# Patient Record
Sex: FEMALE | URBAN - METROPOLITAN AREA
[De-identification: names, ages, dates, MRNs, and addresses within clinical notes are randomized per-mention and may not be internally consistent; named-entity substitution may affect disease eponyms.]

---

## 2017-10-05 ENCOUNTER — OFFICE VISIT (OUTPATIENT)
Dept: ORTHOPEDICS | Facility: CLINIC | Age: 22
End: 2017-10-05

## 2017-10-05 VITALS — HEIGHT: 71 IN | WEIGHT: 140 LBS | BODY MASS INDEX: 19.6 KG/M2

## 2017-10-05 DIAGNOSIS — M22.2X1 PATELLOFEMORAL PAIN SYNDROME OF RIGHT KNEE: Primary | ICD-10-CM

## 2017-10-05 NOTE — PROGRESS NOTES
"Sports Medicine Clinic Visit    PCP: No primary care provider on file.    Rosa Norris is a 22 year old female who is seen  as self referral presenting with right knee pain.     Injury: None recalled    Location of Pain: right knee  Duration of Pain: 1 month(s)  Pain is better with: Rest  Pain is worse with: Running, stairs   Additional Features:   Treatment so far consists of: Rest  Prior History of related problems:     Ht 5' 11\" (1.803 m)  Wt 140 lb (63.5 kg)  BMI 19.53 kg/m2         PMH:  No past medical history on file. neg for chronic med probs    Active problem list:  There is no problem list on file for this patient.      FH:  No family history on file.    SH:  Social History     Social History     Marital status: Single     Spouse name: N/A     Number of children: N/A     Years of education: N/A     Occupational History     Not on file.     Social History Main Topics     Smoking status: Never Smoker     Smokeless tobacco: Never Used     Alcohol use Not on file     Drug use: Not on file     Sexual activity: Not on file     Other Topics Concern     Not on file     Social History Narrative     No narrative on file     Graduated [employer]    MEDS:  See EMR, reviewed  ALL:  See EMR, reviewed    REVIEW OF SYSTEMS:  CONSTITUTIONAL:NEGATIVE for fever, chills, change in weight  INTEGUMENTARY/SKIN: NEGATIVE for worrisome rashes, moles or lesions  EYES: NEGATIVE for vision changes or irritation  ENT/MOUTH: NEGATIVE for ear, mouth and throat problems  RESP:NEGATIVE for significant cough or SOB  BREAST: NEGATIVE for masses, tenderness or discharge  CV: NEGATIVE for chest pain, palpitations or peripheral edema  GI: NEGATIVE for nausea, abdominal pain, heartburn, or change in bowel habits  :NEGATIVE for frequency, dysuria, or hematuria  :NEGATIVE for frequency, dysuria, or hematuria  NEURO: NEGATIVE for weakness, dizziness or paresthesias  ENDOCRINE: NEGATIVE for temperature intolerance, skin/hair " "changes  HEME/ALLERGY/IMMUNE: NEGATIVE for bleeding problems  PSYCHIATRIC: NEGATIVE for changes in mood or affect        Subjective: This 22-year-old female presents with 1 month of right anterior knee discomfort. 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 In the middle of August she did 17 mile race. She made it through the race without any discomfort and did not have any trouble training prior to the race.  For 10 days after the race she had no discomfort. She tried to return to running after that and about 5 miles into the run she had some right-sided anterior knee discomfort that she could feel \"behind the kneecap\". It does not bother her at work. Lately she's been able to do some walking and does not seem to give her pain. She's taken time off running in the past few weeks. The knee has not been swollen.    Objective: She has some tenderness along the posterior lateral patellar facet that is not present at the opposite knee. She has some increased lateral excursion to the kneecaps compared to medial. There is no swelling. She is nontender over the remainder of the patellar facets. Nontender over quadriceps tendon or patellar tendon. Nontender over distal IT band. Nontender over medial or lateral joint line. Anterior and posterior drawers negative. Lachman's is negative. Normal range of motion.. Overlying skin is normal. Appropriate in conversation and affect. Normal arches bilaterally. She has good 2 legged squat form and can do a full squat. She has some improvement secondary made in 1 legged squat form.    Assessment: Right-sided anterior knee discomfort suspect patellofemoral discomfort    Plan: We discussed the possibility that there could be a stress reaction of the bone. She is to be taken some time off running and she agrees to cross train over the next month with some biking or in a pool or simply walking and avoid running until the " pain improves. In the meantime she'll see physical therapy for patellofemoral protocol and to try some kneecap taping. She understands if the symptoms resolve over the next 4-6 weeks that when she starts back to running she'll start on flat surfaces with shorter distances and make sure it's well-tolerated before increasing her mileage.

## 2017-10-05 NOTE — MR AVS SNAPSHOT
After Visit Summary   10/5/2017    Rosa Norris    MRN: [id number]           Patient Information     Date Of Birth          1995        Visit Information        Provider Department      10/5/2017 11:30 AM James Cobb MD [facility] Sports Medicine        Today's Diagnoses     Patellofemoral pain syndrome of right knee    -  1       Follow-ups after your visit        Additional Services     PHYSICAL THERAPY REFERRAL (Internal)       Physical Therapy Referral                  Who to contact     Please call your clinic at [phone] to:    Ask questions about your health    Make or cancel appointments    Discuss your medicines    Learn about your test results    Speak to your doctor   If you have compliments or concerns about an experience at your clinic, or if you wish to file a complaint, please contact [facility] Patient Relations at [phone] or email us at [email or web address]         Additional Information About Your Visit        MyChart Information     [email or web address] is an electronic gateway that provides easy, online access to your medical records. With [email or web address], you can request a clinic appointment, read your test results, renew a prescription or communicate with your care team.     To sign up for [employer] visit the website at [email or web address]   You will be asked to enter the access code listed below, as well as some personal information. Please follow the directions to create your username and password.     Your access code is: [email or web address]  Expires: 2018  6:31 AM     Your access code will  in 90 days. If you need help or a new code, please contact your [facility] Clinic or call [phone] for assistance.        Care EveryWhere ID     This is your Care EveryWhere ID. This could be used by other organizations to access your [place] medical records  [id number]        Your Vitals Were     Height BMI (Body  "Mass Index)                5' 11\" (1.803 m) 19.53 kg/m2           Blood Pressure from Last 3 Encounters:   No data found for BP    Weight from Last 3 Encounters:   10/05/17 140 lb (63.5 kg)              We Performed the Following     PHYSICAL THERAPY REFERRAL (Internal)        Primary Care Provider    None Specified       No primary provider on file.        Equal Access to Services     [facility]: Hadii owen ku hadnguyeno Soedgardoali, waaxda luqadaha, qaybta kaalmada rosalbayolandada, kendall camargoeniddenice martinez . So [facility] [phone].    ATENCIÓN: Si habla español, tiene a jenkins disposición servicios gratuitos de asistencia lingüística. Llame al [phone].    We comply with applicable federal civil rights laws and [place] laws. We do not discriminate on the basis of race, color, national origin, age, disability, sex, sexual orientation, or gender identity.            Thank you!     Thank you for choosing [facility]  for your care. Our goal is always to provide you with excellent care. Hearing back from our patients is one way we can continue to improve our services. Please take a few minutes to complete the written survey that you may receive in the mail after your visit with us. Thank you!             Your Updated Medication List - Protect others around you: Learn how to safely use, store and throw away your medicines at www.disposemymeds.org.      Notice  As of 10/5/2017 12:18 PM    You have not been prescribed any medications.      "

## 2017-10-05 NOTE — LETTER
Date:October 6, 2017      Patient was self referred, no letter generated. Do not send.        [facility] Health Information

## 2017-10-05 NOTE — LETTER
"  10/5/2017      RE: Rosa Norris  [address] [place] [address]       Sports Medicine Clinic Visit    PCP: No primary care provider on file.    Rosa Norris is a 22 year old female who is seen  as self referral presenting with right knee pain.     Injury: None recalled    Location of Pain: right knee  Duration of Pain: 1 month(s)  Pain is better with: Rest  Pain is worse with: Running, stairs   Additional Features:   Treatment so far consists of: Rest  Prior History of related problems:     Ht 5' 11\" (1.803 m)  Wt 140 lb (63.5 kg)  BMI 19.53 kg/m2         PMH:  No past medical history on file. neg for chronic med probs    Active problem list:  There is no problem list on file for this patient.      FH:  No family history on file.    SH:  Social History     Social History     Marital status: Single     Spouse name: N/A     Number of children: N/A     Years of education: N/A     Occupational History     Not on file.     Social History Main Topics     Smoking status: Never Smoker     Smokeless tobacco: Never Used     Alcohol use Not on file     Drug use: Not on file     Sexual activity: Not on file     Other Topics Concern     Not on file     Social History Narrative     No narrative on file     Graduated [employer]    MEDS:  See EMR, reviewed  ALL:  See EMR, reviewed    REVIEW OF SYSTEMS:  CONSTITUTIONAL:NEGATIVE for fever, chills, change in weight  INTEGUMENTARY/SKIN: NEGATIVE for worrisome rashes, moles or lesions  EYES: NEGATIVE for vision changes or irritation  ENT/MOUTH: NEGATIVE for ear, mouth and throat problems  RESP:NEGATIVE for significant cough or SOB  BREAST: NEGATIVE for masses, tenderness or discharge  CV: NEGATIVE for chest pain, palpitations or peripheral edema  GI: NEGATIVE for nausea, abdominal pain, heartburn, or change in bowel habits  :NEGATIVE for frequency, dysuria, or hematuria  :NEGATIVE for frequency, dysuria, or hematuria  NEURO: NEGATIVE for weakness, dizziness or " "paresthesias  ENDOCRINE: NEGATIVE for temperature intolerance, skin/hair changes  HEME/ALLERGY/IMMUNE: NEGATIVE for bleeding problems  PSYCHIATRIC: NEGATIVE for changes in mood or affect        Subjective: This 22-year-old female presents with 1 month of right anterior knee discomfort. 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 In the middle of August she did 17 mile race. She made it through the race without any discomfort and did not have any trouble training prior to the race.  For 10 days after the race she had no discomfort. She tried to return to running after that and about 5 miles into the run she had some right-sided anterior knee discomfort that she could feel \"behind the kneecap\". It does not bother her at work. Lately she's been able to do some walking and does not seem to give her pain. She's taken time off running in the past few weeks. The knee has not been swollen.    Objective: She has some tenderness along the posterior lateral patellar facet that is not present at the opposite knee. She has some increased lateral excursion to the kneecaps compared to medial. There is no swelling. She is nontender over the remainder of the patellar facets. Nontender over quadriceps tendon or patellar tendon. Nontender over distal IT band. Nontender over medial or lateral joint line. Anterior and posterior drawers negative. Lachman's is negative. Normal range of motion.. Overlying skin is normal. Appropriate in conversation and affect. Normal arches bilaterally. She has good 2 legged squat form and can do a full squat. She has some improvement secondary made in 1 legged squat form.    Assessment: Right-sided anterior knee discomfort suspect patellofemoral discomfort    Plan: We discussed the possibility that there could be a stress reaction of the bone. She is to be taken some time off running and she agrees to cross train over the next month " with some biking or in a pool or simply walking and avoid running until the pain improves. In the meantime she'll see physical therapy for patellofemoral protocol and to try some kneecap taping. She understands if the symptoms resolve over the next 4-6 weeks that when she starts back to running she'll start on flat surfaces with shorter distances and make sure it's well-tolerated before increasing her mileage.                        James Cobb MD

## 2017-10-11 ENCOUNTER — THERAPY VISIT (OUTPATIENT)
Dept: PHYSICAL THERAPY | Facility: CLINIC | Age: 22
End: 2017-10-11
Payer: COMMERCIAL

## 2017-10-11 DIAGNOSIS — M25.561 RIGHT KNEE PAIN, UNSPECIFIED CHRONICITY: Primary | ICD-10-CM

## 2017-10-11 PROCEDURE — 97530 THERAPEUTIC ACTIVITIES: CPT | Mod: GP | Performed by: PHYSICAL THERAPIST

## 2017-10-11 PROCEDURE — 97110 THERAPEUTIC EXERCISES: CPT | Mod: GP | Performed by: PHYSICAL THERAPIST

## 2017-10-11 PROCEDURE — 97161 PT EVAL LOW COMPLEX 20 MIN: CPT | Mod: GP | Performed by: PHYSICAL THERAPIST

## 2017-10-11 ASSESSMENT — ACTIVITIES OF DAILY LIVING (ADL)
AS_A_RESULT_OF_YOUR_KNEE_INJURY,_HOW_WOULD_YOU_RATE_YOUR_CURRENT_LEVEL_OF_DAILY_ACTIVITY?: NEARLY NORMAL
LIMPING: I DO NOT HAVE THE SYMPTOM
SIT WITH YOUR KNEE BENT: ACTIVITY IS NOT DIFFICULT
SQUAT: ACTIVITY IS NOT DIFFICULT
STIFFNESS: I DO NOT HAVE THE SYMPTOM
WEAKNESS: I DO NOT HAVE THE SYMPTOM
KNEEL ON THE FRONT OF YOUR KNEE: ACTIVITY IS NOT DIFFICULT
HOW_WOULD_YOU_RATE_THE_CURRENT_FUNCTION_OF_YOUR_KNEE_DURING_YOUR_USUAL_DAILY_ACTIVITIES_ON_A_SCALE_FROM_0_TO_100_WITH_100_BEING_YOUR_LEVEL_OF_KNEE_FUNCTION_PRIOR_TO_YOUR_INJURY_AND_0_BEING_THE_INABILITY_TO_PERFORM_ANY_OF_YOUR_USUAL_DAILY_ACTIVITIES?: 95
GIVING WAY, BUCKLING OR SHIFTING OF KNEE: I DO NOT HAVE THE SYMPTOM
RISE FROM A CHAIR: ACTIVITY IS NOT DIFFICULT
SWELLING: I DO NOT HAVE THE SYMPTOM
HOW_WOULD_YOU_RATE_THE_OVERALL_FUNCTION_OF_YOUR_KNEE_DURING_YOUR_USUAL_DAILY_ACTIVITIES?: NORMAL
KNEE_ACTIVITY_OF_DAILY_LIVING_SCORE: 98.57
PAIN: I DO NOT HAVE THE SYMPTOM
KNEE_ACTIVITY_OF_DAILY_LIVING_SUM: 69
GO DOWN STAIRS: ACTIVITY IS MINIMALLY DIFFICULT
RAW_SCORE: 69
STAND: ACTIVITY IS NOT DIFFICULT
GO UP STAIRS: ACTIVITY IS NOT DIFFICULT
WALK: ACTIVITY IS NOT DIFFICULT

## 2017-10-11 NOTE — MR AVS SNAPSHOT
After Visit Summary   10/11/2017    Rosa Norris    MRN: [id number]           Patient Information     Date Of Birth          1995        Visit Information        Provider Department      10/11/2017 4:40 PM Marcia Waldrop PT [facility]        Today's Diagnoses     Right knee pain, unspecified chronicity    -  1       Follow-ups after your visit        Your next 10 appointments already scheduled     Oct 18, 2017  4:00 PM CDT   MATEUS Extremity with CHELSEY Romo [facility] ([facility])    [address] [place] [id number] [phone]            Oct 25, 2017  7:00 AM CDT   MATEUS Extremity with Rufina Mathis PTA   [facility] ([facility])    [address] [place] [id number] [phone]            Nov 09, 2017  4:50 PM CST   MATEUS Extremity with Marcia Waldrop PT   [facility] ([facility])    [address] [place] [id number] [phone]              Who to contact     If you have questions or need follow up information about today's clinic visit or your schedule please contact [facility] PHYSICAL THERAPY [facility] directly at [phone].  Normal or non-critical lab and imaging results will be communicated to you by MyChart, letter or phone within 4 business days after the clinic has received the results. If you do not hear from us within 7 days, please contact the clinic through MyChart or phone. If you have a critical or abnormal lab result, we will notify you by phone as soon as possible.  Submit refill requests through [employer] or call your pharmacy and they will forward the refill request to us. Please allow 3 business days for your refill to be completed.          Additional Information About Your Visit        [facility] Information     [employer] lets you send  "messages to your doctor, view your test results, renew your prescriptions, schedule appointments and more. To sign up, go to [email or web address] . Click on \"Log in\" on the left side of the screen, which will take you to the Welcome page. Then click on \"Sign up Now\" on the right side of the page.     You will be asked to enter the access code listed below, as well as some personal information. Please follow the directions to create your username and password.     Your access code is: [email or web address]  Expires: 2018  6:31 AM     Your access code will  in 90 days. If you need help or a new code, please call your [place] clinic or [phone].        Care EveryWhere ID     This is your Care EveryWhere ID. This could be used by other organizations to access your [facility] medical records  [id number]         Blood Pressure from Last 3 Encounters:   No data found for BP    Weight from Last 3 Encounters:   10/05/17 63.5 kg (140 lb)              We Performed the Following     HC PT EVAL, LOW COMPLEXITY     MATEUS INITIAL EVAL REPORT     THERAPEUTIC ACTIVITIES     THERAPEUTIC EXERCISES        Primary Care Provider    None Specified       No primary provider on file.        Equal Access to Services     [facility] : Hadii owen Vazquez, wavalareida james, qaybta kaalmada reginaldo, kendall martinez . So [facility] [phone].    ATENCIÓN: Si habla español, tiene a jenkins disposición servicios gratuitos de asistencia lingüística. Llame al [phone].    We comply with applicable federal civil rights laws and [place] laws. We do not discriminate on the basis of race, color, national origin, age, disability, sex, sexual orientation, or gender identity.            Thank you!     Thank you for choosing [facility] PHYSICAL THERAPY [facility]  for your care. Our goal is always to provide you with excellent care. Hearing back from our patients is one way we can continue to improve our services. Please " take a few minutes to complete the written survey that you may receive in the mail after your visit with us. Thank you!             Your Updated Medication List - Protect others around you: Learn how to safely use, store and throw away your medicines at www.disposemymeds.org.      Notice  As of 10/11/2017 11:59 PM    You have not been prescribed any medications.

## 2017-10-11 NOTE — PROGRESS NOTES
KEY PT FINDINGS:  1) Impaired proximal hip strength (right more than left)  2) Impaired SL mechanics and control  3) No tenderness to palpation, unable to provoke sx during today's visit.     Physical Therapy Initial Evaluation: Subjective History     Injury/Condition Details:  Presenting Complaint Right Knee Pain   Onset Timing/Date 9/28/2017 (Has not run since the 1st)   Mechanism Has been running since freshman in high school.   Took 1 week off in the beginning of August and then ran the [employer] relay in the middle of August. She went on a 7 miles run 12 days after the [place] and had to walk home due to the pain - was unable to go up the stairs after the run - the worst was descending a hill. Took 6 days off and resumed slowly and build mileage. Was able to do a 8 mile run 1 week before the TC 10 mile without problem but was unable to complete a 5 mile race.       Symptom Behavior Details    Primary Symptoms Sporadic symptoms; Activity/position dependent, pain (Location: lateral to the patellar tendon, Quality: Sharp), catching/locking (feels like it needs to crack)   Worst Pain 6/10 (with running)   Symptom Provocators Nothing beyond running, after running - stairs (descending)    Point in run symptoms begin: 3-4 mile  Better/ worse as run continues: worse     Best Pain 0/10    Symptom Relievers Not running   Time of day dependent? No   Recent symptom change? 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   Knee contusion  Ran in High School and College ([employer])   Notable medical history See Epic Chart           Lower Extremity Exam    Dynamic Movement Screen:  2 leg stance: Genu varus tibial alignment, neutral calcaneal posture, squinting patellae (left more than right)  2 leg squat:Excessive posterior hip excursion (reduced anterior knee excursion) and Toes off at end range.     1 leg stance:   Right: normal  Left: normal    1 leg squat:   Right: proprioceptive challenge, excessive femoral IR/ADD and excessive anterior knee excursion  Left: proprioceptive challenge, excessive femoral IR/ADD and excessive anterior knee excursion    Gait: Questionable torsion or version with slight toe in pattern,     Patellofemoral Joint Examination:  Test Right Left   Patellar Orientation:   90 deg flexion neutral neutral   OKC Patellar Tracking Normal Normal   Patellar Orientation:  0 deg flexion mild lateral tilt mild lateral tilt   Quadrant Mobility (Med:Lat) 2:2  2:2   Effusion 0 0   Quad Activation Normal Normal     Knee Joint AROM   Right Left Difference   Hyperextension 3 deg 3 deg 0 deg   Extension 0 deg 0 deg 0 deg   Flexion 140 deg 140 deg 0 deg     Palpation:   Tender to palpation at the following structures: None (patient reports pain location to be medial joint line, lateral fat pad area but not tender today)    Hip Joint PROM Screen: symmetrical    Lower Extremity Muscle Strength (x/5)   Right Left   Hip ER 4/5 4/5   Hip IR 4+/5 4+/5   Hip ABD 4-/5 4/5   Hip ADD NT/5 NT/5   Hip Ext 4/5 4/5   Knee Flex 5/5 5/5     Lower Extremity Flexibility Screen:   Right Left   Hamstring + +   RUTH - -   Goalie Stretch - -   Hip Flexor + +   ITB/Lat Hip in SL + +    Quadriceps - -   Gastroc/ Soleus + +   - = normal  + = mild tightness  ++ = moderate tightness  +++ = significant tightness    Foot Screen:   neutral calcaneal orientation and restricted ankle DF noted     Assessment/Plan:  Rosa presents to physical therapy with complaints of right knee pain only with running. Differential includes stress rxn along lateral tibia or PFPS and maltracking. Patient has been instructed by Dr. Cobb to stop running for 4 weeks (3 weeks remaining at time of initial visit) and focus on strength and form prior to running. We were unable to provoke her pain on today's evaluation and so today's visit was focused on strength and movement coaching.     Patient is a 22 year old female with right side knee complaints.    Patient has the following significant findings with corresponding treatment plan.                Diagnosis 1:  Right Knee pain  Pain -  hot/cold therapy, manual therapy, STS, splint/taping/bracing/orthotics, self management and education  Decreased ROM/flexibility - manual therapy, therapeutic exercise and home program  Decreased strength - therapeutic exercise, therapeutic activities and home program  Impaired balance - neuro re-education, therapeutic activities and home program  Decreased proprioception - neuro re-education, therapeutic activities and home program  Impaired muscle performance - neuro re-education and home program  Decreased function - therapeutic activities and home program    Therapy Evaluation Codes:   1) History comprised of:   Personal factors that impact the plan of care:      Overall behavior pattern and Time since onset of symptoms.    Comorbidity factors that impact the plan of care are:      None.     Medications impacting care: None.  2) Examination of Body Systems comprised of:   Body structures and functions that impact the plan of care:      Knee.   Activity limitations that impact the plan of care are:      Running, Stairs and  Walking.  3) Clinical presentation characteristics are:   Stable/Uncomplicated.  4) Decision-Making    Low complexity using standardized patient assessment instrument and/or measureable assessment of functional outcome.  Cumulative Therapy Evaluation is: Low complexity.    Previous and current functional limitations:  (See Goal Flow Sheet for this information)    Short term and Long term goals: (See Goal Flow Sheet for this information)     Communication ability:  Patient appears to be able to clearly communicate and understand verbal and written communication and follow directions correctly.  Treatment Explanation - The following has been discussed with the patient:   RX ordered/plan of care  Anticipated outcomes  Possible risks and side effects  This patient would benefit from PT intervention to resume normal activities.   Rehab potential is good.    Frequency:  1 X week, once daily  Duration:  for 3 weeks then transition to 2 x month for 2 months  Discharge Plan:  Achieve all LTG.  Independent in home treatment program.  Reach maximal therapeutic benefit.    Please refer to the daily flowsheet for treatment today, total treatment time and time spent performing 1:1 timed codes.

## 2017-10-12 PROBLEM — M25.561 RIGHT KNEE PAIN: Status: ACTIVE | Noted: 2017-10-12

## 2017-10-18 ENCOUNTER — THERAPY VISIT (OUTPATIENT)
Dept: PHYSICAL THERAPY | Facility: CLINIC | Age: 22
End: 2017-10-18
Payer: COMMERCIAL

## 2017-10-18 DIAGNOSIS — M25.561 RIGHT KNEE PAIN, UNSPECIFIED CHRONICITY: ICD-10-CM

## 2017-10-18 PROCEDURE — 97112 NEUROMUSCULAR REEDUCATION: CPT | Mod: GP | Performed by: PHYSICAL THERAPIST

## 2017-10-18 PROCEDURE — 97110 THERAPEUTIC EXERCISES: CPT | Mod: GP | Performed by: PHYSICAL THERAPIST

## 2017-10-18 NOTE — PROGRESS NOTES
HIP LEFT (lbs) RIGHT (lbs)   Flexion - -   Abduction 25, 23 23, 21   External Rotation 35, 34 37,38   Internal Rotation 14,15 20,19

## 2017-10-18 NOTE — MR AVS SNAPSHOT
"              After Visit Summary   10/18/2017    Rosa Norris    MRN: [id number]           Patient Information     Date Of Birth          1995        Visit Information        Provider Department      10/18/2017 4:00 PM Marcia Waldrop, PT [facility] Physical Therapy [facility]        Today's Diagnoses     Right knee pain, unspecified chronicity           Follow-ups after your visit        Your next 10 appointments already scheduled     Oct 25, 2017  7:00 AM CDT   MATEUS Extremity with Rufina Mathis PTA    [facility] ([facility])    [address] [place] [id number] [phone]            Nov 09, 2017  4:50 PM CST   MATEUS Extremity with CHELSEY Rom[facility] Physical Therapy [facility] ([facility])    [address] [place] [address] [phone]              Who to contact     If you have questions or need follow up information about today's clinic visit or your schedule please contact [facility] PHYSICAL THERAPY [facility] directly at [phone].  Normal or non-critical lab and imaging results will be communicated to you by [employer], letter or phone within 4 business days after the clinic has received the results. If you do not hear from us within 7 days, please contact the clinic through [employer] or phone. If you have a critical or abnormal lab result, we will notify you by phone as soon as possible.  Submit refill requests through Sing[employer] or call your pharmacy and they will forward the refill request to us. Please allow 3 business days for your refill to be completed.          Additional Information About Your Visit        MyChart Information     Singular lets you send messages to your doctor, view your test results, renew your prescriptions, schedule appointments and more. To sign up, go to [email or web address] . Click on \"Log in\" on the left side of the screen, which will take you to the " "Welcome page. Then click on \"Sign up Now\" on the right side of the page.     You will be asked to enter the access code listed below, as well as some personal information. Please follow the directions to create your username and password.     Your access code is: [email or web address]  Expires: 2018  6:31 AM     Your access code will  in 90 days. If you need help or a new code, please call your [place] clinic or [phone].        Care EveryWhere ID     This is your Care EveryWhere ID. This could be used by other organizations to access your [place] medical records  [id number]         Blood Pressure from Last 3 Encounters:   No data found for BP    Weight from Last 3 Encounters:   10/05/17 63.5 kg (140 lb)              We Performed the Following     NEUROMUSCULAR RE-EDUCATION     THERAPEUTIC EXERCISES        Primary Care Provider    None Specified       No primary provider on file.        Equal Access to Services     [facility]: Hadii owen Vazquez, wavalarieda james, cailin kaalmafrances hair, kendall martinez . So [facility] [phone].    ATENCIÓN: Si habla español, tiene a jenkins disposición servicios gratuitos de asistencia lingüística. Llame al [phone].    We comply with applicable federal civil rights laws and [place] laws. We do not discriminate on the basis of race, color, national origin, age, disability, sex, sexual orientation, or gender identity.            Thank you!     Thank you for choosing [facility] PHYSICAL THERAPY [facility]  for your care. Our goal is always to provide you with excellent care. Hearing back from our patients is one way we can continue to improve our services. Please take a few minutes to complete the written survey that you may receive in the mail after your visit with us. Thank you!             Your Updated Medication List - Protect others around you: Learn how to safely use, store and throw away your medicines at www.disposemymeds.org.    "   Notice  As of 10/18/2017  4:40 PM    You have not been prescribed any medications.

## 2017-10-25 ENCOUNTER — THERAPY VISIT (OUTPATIENT)
Dept: PHYSICAL THERAPY | Facility: CLINIC | Age: 22
End: 2017-10-25
Payer: COMMERCIAL

## 2017-10-25 DIAGNOSIS — M25.561 RIGHT KNEE PAIN, UNSPECIFIED CHRONICITY: ICD-10-CM

## 2017-10-25 PROCEDURE — 97110 THERAPEUTIC EXERCISES: CPT | Mod: GP | Performed by: PHYSICAL THERAPY ASSISTANT

## 2017-10-25 PROCEDURE — 97530 THERAPEUTIC ACTIVITIES: CPT | Mod: GP | Performed by: PHYSICAL THERAPY ASSISTANT

## 2017-10-25 PROCEDURE — 97112 NEUROMUSCULAR REEDUCATION: CPT | Mod: GP | Performed by: PHYSICAL THERAPY ASSISTANT

## 2017-11-14 ENCOUNTER — THERAPY VISIT (OUTPATIENT)
Dept: PHYSICAL THERAPY | Facility: CLINIC | Age: 22
End: 2017-11-14
Payer: COMMERCIAL

## 2017-11-14 DIAGNOSIS — M25.561 RIGHT KNEE PAIN, UNSPECIFIED CHRONICITY: ICD-10-CM

## 2017-11-14 PROCEDURE — 97110 THERAPEUTIC EXERCISES: CPT | Mod: GP | Performed by: PHYSICAL THERAPIST

## 2017-11-14 PROCEDURE — 97530 THERAPEUTIC ACTIVITIES: CPT | Mod: GP | Performed by: PHYSICAL THERAPIST

## 2017-11-14 NOTE — MR AVS SNAPSHOT
"              After Visit Summary   11/14/2017    Rosa Norris    MRN: [id number]           Patient Information     Date Of Birth          1995        Visit Information        Provider Department      11/14/2017 7:00 AM Marcia Waldrop PT [facility] Physical Therapy [facility]        Today's Diagnoses     Right knee pain, unspecified chronicity           Follow-ups after your visit        Your next 10 appointments already scheduled     Nov 24, 2017  9:30 AM CST   MATEUS Extremity with CHELSEY [facility] Physical Therapy [facility] ([facility] [place])    [address] [place] [address] [phone]              Who to contact     If you have questions or need follow up information about today's clinic visit or your schedule please contact [facility] PHYSICAL THERAPY [facility] directly at [phone].  Normal or non-critical lab and imaging results will be communicated to you by "[employer]hart, letter or phone within 4 business days after the clinic has received the results. If you do not hear from us within 7 days, please contact the clinic through "[employer]hart or phone. If you have a critical or abnormal lab result, we will notify you by phone as soon as possible.  Submit refill requests through 360T or call your pharmacy and they will forward the refill request to us. Please allow 3 business days for your refill to be completed.          Additional Information About Your Visit        MyChart Information     360T lets you send messages to your doctor, view your test results, renew your prescriptions, schedule appointments and more. To sign up, go to [email or web address] . Click on \"Log in\" on the left side of the screen, which will take you to the Welcome page. Then click on \"Sign up Now\" on the right side of the page.     You will be asked to enter the access code listed below, as well as some personal information. Please follow the directions to create your username and " password.     Your access code is: [email or web address]  Expires: 2018  5:31 AM     Your access code will  in 90 days. If you need help or a new code, please call your [place] clinic or [phone].        Care EveryWhere ID     This is your Care EveryWhere ID. This could be used by other organizations to access your [place] medical records  [id number]         Blood Pressure from Last 3 Encounters:   No data found for BP    Weight from Last 3 Encounters:   10/05/17 63.5 kg (140 lb)              We Performed the Following     THERAPEUTIC ACTIVITIES     THERAPEUTIC EXERCISES        Primary Care Provider    None Specified       No primary provider on file.        Equal Access to Services     [facility]: Hadii owen Vazquez, wailiana ramirez, cailin kaalmafrances hair, kendall martinez . So [facility] [phone].    ATENCIÓN: Si habla español, tiene a jenkins disposición servicios gratuitos de asistencia lingüística. Llame al [phone].    We comply with applicable federal civil rights laws and [place] laws. We do not discriminate on the basis of race, color, national origin, age, disability, sex, sexual orientation, or gender identity.            Thank you!     Thank you for choosing [facility] PHYSICAL THERAPY [facility]  for your care. Our goal is always to provide you with excellent care. Hearing back from our patients is one way we can continue to improve our services. Please take a few minutes to complete the written survey that you may receive in the mail after your visit with us. Thank you!             Your Updated Medication List - Protect others around you: Learn how to safely use, store and throw away your medicines at www.disposemymeds.org.      Notice  As of 2017  9:52 AM    You have not been prescribed any medications.

## 2017-11-14 NOTE — PROGRESS NOTES
2D Video Running Gait Analysis   Reproduction of  Sports Medicine [facility] 2D Video Analysis    Cali Ocampo PT, PhD 2015     SAGITTAL PLANE OBSERVATIONS  Variable Right Left   Foot Strike Pattern Rear foot Rear foot   IC Tibial Inclination Angle (within 5  of vertical) Vertical  Vertical    IC KF Angle (~20 ) Mild decrease Appropriate   MS KF Angle (~40 ) Appropriate Decreased   MS Ankle DF Angle   (knee over toes) Appropriate Appropriate   Push-off Hip Ext Angle (0-5 ) Appropriate Appropriate   Anterior Pelvic Tilt (5-10 ) Appropriate Appropriate   Lumbar Lordosis Appropriate Appropriate   COM Excursion (6-8 cm) Appropriate Appropriate     Forward Trunk Lean (5-10  forward) Appropriate       FRONTAL PLANE OBSERVATIONS  Variable Right Left   Trunk Side Bend (vertical) Appropriate Appropriate   Lateral Pelvic Drop   (males 3-5 , females 4-7 ) Appropriate Appropriate   Knee Center Position (midline) Mild medial Mild medial   Knee Separation   (slight separation) Narrow Narrow   Foot-COM Position   (speed dependent) Mild cross-over Mild cross-over   Rearfoot Position   (neutral or mild pronation) Appropriate Appropriate   Forefoot Position   (neutral or mild abduction) Appropriate Appropriate     Other Observations  Trunk Rotation Mild increase in both directions   Arm Swing Symmetrical Wide Arm Carriage   Heel Height (symmetrical) Lateral heel whip bilaterally

## 2017-11-28 ENCOUNTER — THERAPY VISIT (OUTPATIENT)
Dept: PHYSICAL THERAPY | Facility: CLINIC | Age: 22
End: 2017-11-28
Payer: COMMERCIAL

## 2017-11-28 DIAGNOSIS — M25.561 RIGHT KNEE PAIN, UNSPECIFIED CHRONICITY: Primary | ICD-10-CM

## 2017-11-28 PROCEDURE — 97112 NEUROMUSCULAR REEDUCATION: CPT | Mod: GP | Performed by: PHYSICAL THERAPY ASSISTANT

## 2017-11-28 PROCEDURE — 97110 THERAPEUTIC EXERCISES: CPT | Mod: GP | Performed by: PHYSICAL THERAPY ASSISTANT

## 2017-12-06 ENCOUNTER — OFFICE VISIT (OUTPATIENT)
Dept: ORTHOPEDICS | Facility: CLINIC | Age: 22
End: 2017-12-06

## 2017-12-06 VITALS — BODY MASS INDEX: 19.6 KG/M2 | HEIGHT: 71 IN | WEIGHT: 140 LBS

## 2017-12-06 DIAGNOSIS — M22.2X1 PATELLOFEMORAL PAIN SYNDROME OF RIGHT KNEE: Primary | ICD-10-CM

## 2017-12-06 NOTE — LETTER
"  12/6/2017      RE: Rosa Norris  [address] [place] [address]        Subjective:   Rosa Norris is a 22 year old female who complains of right knee pain. She is a patient of Dr. Cobb and has been doing physical therapy for the last 2 months with some improvement. Since seeing Dr. Cobb, she took about 6 weeks of rest, alleviating her symptoms. During that time, she saw PT who assessed her gait, stating otherwise normal save for a whip during swing phase. No specific work on mechanics. Since mid-November, has been trying return to run with interval running/walking. She cannot complete the return to run program given reproduction of right lateral patellar pain during the run phases. This is the only time she has symptoms. Otherwise asymptomatic at rest and walking/biking. Denies catching, popping, locking. Denies patellar dislocation/displacement. During her return to run, she has tried taping the patella, which did not improve her symptoms. She is frustrated regarding her inability to return to running.    Background:   Date of injury: None   Duration of symptoms: 3 months   Aggravating factors: Running   Relieving Factors: rest  Prior Evaluation: Prior Physician Evalutation: Dr. Cobb     PAST MEDICAL, SOCIAL, SURGICAL AND FAMILY HISTORY: She  has no past medical history on file.  She  has no past surgical history on file.  Her family history is not on file.  She reports that she has never smoked. She has never used smokeless tobacco.      ALLERGIES: She has No Known Allergies.    CURRENT MEDICATIONS: She currently has no medications in their medication list.     REVIEW OF SYSTEMS: 3 point review of systems is negative except as noted above.     Exam:   Ht 5' 11\" (1.803 m)  Wt 140 lb (63.5 kg)  BMI 19.53 kg/m2        CONSTITUTIONAL: healthy, alert and no distress  HEAD: Normocephalic. No masses, lesions, tenderness or abnormalities  SKIN: no suspicious lesions or rashes  GAIT: normal  NEUROLOGIC: " Non-focal  PSYCHIATRIC: affect normal/bright and mentation appears normal.    MUSCULOSKELETAL: Right Knee: PROM -5o extension, 140o flexion. No effusion noted. Patellar grind negative, lateral glide 2 quadrants medially and laterally, no patellar tilt. Point tender to palpation on patellar lateral facet with knee fully extended as well as flexed at 30o. Nontender patellar tendon at origin, body, or insertion. Negative joint line tenderness. Negative varus and valgus stress test at 0o and 10o. Negative Lachman's with good end point. Negative Keenan's test.      Assessment/Plan:   (M22.2X1) Patellofemoral pain syndrome of right knee  (primary encounter diagnosis)  Comment:   Plan: MR Low Ext Joint Rt w/o Contrast, CANCELED: XR         Knee Right G/E 4 Views        Pain is directly located at lateral facet of right patella, brought on solely with running. Differential includes right patellar lateral facet chondromalacia or patellar bone stress injury. Given persistence of symptoms and inability to run without pain, will order 3T MRI with articular protocol. RTC after MRI to review and discuss results.       X-RAY INTERPRETATION:   X-Ray of the Right Knee: 3-view, Calderon, lateral, sunrise   ordered and interpreted in the office today was negative for fracture, subluxation or joint space abnormality.     Nils Hunter MD  Family Medicine Resident, R3    Patient staffed and seen with Dr. Yisel Fallon    Attending Note:   I have personally examined this patient and have reviewed the clinical presentation and progress note with the resident. I agree with the treatment plan as outlined. The plan was formulated with the resident on the day of the patient's visit. I have reviewed all imaging with the resident and agree with the findings in the documentation.     Yisel Fallon MD, CAQ, CCD  [facility]  Sports Medicine and Bone Health    Yisel Fallon MD

## 2017-12-06 NOTE — PROGRESS NOTES
" Subjective:   Rosa Norris is a 22 year old female who complains of right knee pain. She is a patient of Dr. oCbb and has been doing physical therapy for the last 2 months with some improvement. Since seeing Dr. Cobb, she took about 6 weeks of rest, alleviating her symptoms. During that time, she saw PT who assessed her gait, stating otherwise normal save for a whip during swing phase. No specific work on mechanics. Since mid-November, has been trying return to run with interval running/walking. She cannot complete the return to run program given reproduction of right lateral patellar pain during the run phases. This is the only time she has symptoms. Otherwise asymptomatic at rest and walking/biking. Denies catching, popping, locking. Denies patellar dislocation/displacement. During her return to run, she has tried taping the patella, which did not improve her symptoms. She is frustrated regarding her inability to return to running.    Background:   Date of injury: None   Duration of symptoms: 3 months   Aggravating factors: Running   Relieving Factors: rest  Prior Evaluation: Prior Physician Evalutation: Dr. Cobb     PAST MEDICAL, SOCIAL, SURGICAL AND FAMILY HISTORY: She  has no past medical history on file.  She  has no past surgical history on file.  Her family history is not on file.  She reports that she has never smoked. She has never used smokeless tobacco.      ALLERGIES: She has No Known Allergies.    CURRENT MEDICATIONS: She currently has no medications in their medication list.     REVIEW OF SYSTEMS: 3 point review of systems is negative except as noted above.     Exam:   Ht 5' 11\" (1.803 m)  Wt 140 lb (63.5 kg)  BMI 19.53 kg/m2        CONSTITUTIONAL: healthy, alert and no distress  HEAD: Normocephalic. No masses, lesions, tenderness or abnormalities  SKIN: no suspicious lesions or rashes  GAIT: normal  NEUROLOGIC: Non-focal  PSYCHIATRIC: affect normal/bright and mentation appears " normal.    MUSCULOSKELETAL: Right Knee: PROM -5o extension, 140o flexion. No effusion noted. Patellar grind negative, lateral glide 2 quadrants medially and laterally, no patellar tilt. Point tender to palpation on patellar lateral facet with knee fully extended as well as flexed at 30o. Nontender patellar tendon at origin, body, or insertion. Negative joint line tenderness. Negative varus and valgus stress test at 0o and 10o. Negative Lachman's with good end point. Negative Keenan's test.      Assessment/Plan:   (M22.2X1) Patellofemoral pain syndrome of right knee  (primary encounter diagnosis)  Comment:   Plan: MR Low Ext Joint Rt w/o Contrast, CANCELED: XR         Knee Right G/E 4 Views        Pain is directly located at lateral facet of right patella, brought on solely with running. Differential includes right patellar lateral facet chondromalacia or patellar bone stress injury. Given persistence of symptoms and inability to run without pain, will order 3T MRI with articular protocol. RTC after MRI to review and discuss results.       X-RAY INTERPRETATION:   X-Ray of the Right Knee: 3-view, Calderon, lateral, sunrise   ordered and interpreted in the office today was negative for fracture, subluxation or joint space abnormality.     Nils Hunter MD  Family Medicine Resident, R3    Patient staffed and seen with Dr. Yisel Fallon

## 2017-12-06 NOTE — MR AVS SNAPSHOT
After Visit Summary   12/6/2017    Rosa Norris    MRN: [id number]           Patient Information     Date Of Birth          1995        Visit Information        Provider Department      12/6/2017 11:00 AM Yisel Fallon MD [facility] Sports Medicine        Today's Diagnoses     Patellofemoral pain syndrome of right knee    -  1       Follow-ups after your visit        Your next 10 appointments already scheduled     Dec 15, 2017  7:45 PM CST   (Arrive by 7:30 PM)   MR LOWER EXTREMITY JOINT RIGHT W/O CONTRAST with [id number]   [facility] MRI ([email or web address])    [address] [address] [phone]           Take your medicines as usual, unless your doctor tells you not to. Bring a list of your current medicines to your exam (including vitamins, minerals and over-the-counter drugs). Also bring the results of similar scans you may have had.  Please remove any body piercings and hair extensions before you arrive.  Follow your doctor s orders. If you do not, we may have to postpone your exam.  You will not have contrast for this exam. You do not need to do anything special to prepare.  The MRI machine uses a strong magnet. Please wear clothes without metal (snaps, zippers). A sweatsuit works well, or we may give you a hospital gown.   **IMPORTANT** THE INSTRUCTIONS BELOW ARE ONLY FOR THOSE PATIENTS WHO HAVE BEEN TOLD THEY WILL RECEIVE SEDATION OR GENERAL ANESTHESIA DURING THEIR MRI PROCEDURE:  IF YOU WILL RECEIVE SEDATION (take medicine to help you relax during your exam):   You must get the medicine from your doctor before you arrive. Bring the medicine to the exam. Do not take it at home.   Arrive one hour early. Bring someone who can take you home after the test. Your medicine will make you sleepy. After the exam, you may not drive, take a bus or take a taxi by yourself.   No eating 8 hours before your exam. You may have  clear liquids up until 4 hours before your exam. (Clear liquids include water, clear tea, black coffee and fruit juice without pulp.)  IF YOU WILL RECEIVE ANESTHESIA (be asleep for your exam):   Arrive 1 1/2 hours early. Bring someone who can take you home after the test. You may not drive, take a bus or take a taxi by yourself.   No eating 8 hours before your exam. You may have clear liquids up until 4 hours before your exam. (Clear liquids include water, clear tea, black coffee and fruit juice without pulp.)   You will spend four to five hours in the recovery room.  Please call the Imaging Department at your exam site with any questions.            Jan 03, 2018  4:00 PM CST   (Arrive by 3:45 PM)   Return Visit with Yisel Fallon MD   [facility] ([facility])    [address] [address] [phone]              Future tests that were ordered for you today     Open Future Orders        Priority Expected Expires Ordered    MR Low Ext Joint Rt w/o Contrast Routine  12/6/2018 12/6/2017            Who to contact     Please call your clinic at [phone] to:    Ask questions about your health    Make or cancel appointments    Discuss your medicines    Learn about your test results    Speak to your doctor   If you have compliments or concerns about an experience at your clinic, or if you wish to file a complaint, please contact [facility] Patient Relations at [phone] or email us at [email or web address]         Additional Information About Your Visit        [employer] Information     [employer] is an electronic gateway that provides easy, online access to your medical records. With [employer], you can request a clinic appointment, read your test results, renew a prescription or communicate with your care team.     To sign up for [employer] visit the website at [email or web address]   You will  "be asked to enter the access code listed below, as well as some personal information. Please follow the directions to create your username and password.     Your access code is: [email or web address]  Expires: 2018  5:31 AM     Your access code will  in 90 days. If you need help or a new code, please contact your [facility] or call [phone] for assistance.        Care EveryWhere ID     This is your Care EveryWhere ID. This could be used by other organizations to access your [place] medical records  [id number]        Your Vitals Were     Height BMI (Body Mass Index)                1.803 m (5' 11\") 19.53 kg/m2           Blood Pressure from Last 3 Encounters:   No data found for BP    Weight from Last 3 Encounters:   17 63.5 kg (140 lb)   10/05/17 63.5 kg (140 lb)               Primary Care Provider    None Specified       No primary provider on file.        Equal Access to Services     [facility] : Hadii owen martinoo Sotyler, waaxda luqadaha, qaybta kaalmada adeegyada, kendall martinez . So [facility] [phone].    ATENCIÓN: Si habla español, tiene a jenkins disposición servicios gratuitos de asistencia lingüística. Llame al [phone].    We comply with applicable federal civil rights laws and [place] laws. We do not discriminate on the basis of race, color, national origin, age, disability, sex, sexual orientation, or gender identity.            Thank you!     Thank you for choosing [facility]  for your care. Our goal is always to provide you with excellent care. Hearing back from our patients is one way we can continue to improve our services. Please take a few minutes to complete the written survey that you may receive in the mail after your visit with us. Thank you!             Your Updated Medication List - Protect others around you: Learn how to safely use, store and throw away your medicines at www.disposemymeds.org.      Notice "  As of 12/6/2017 12:31 PM    You have not been prescribed any medications.

## 2017-12-06 NOTE — PROGRESS NOTES
Attending Note:   I have personally examined this patient and have reviewed the clinical presentation and progress note with the resident. I agree with the treatment plan as outlined. The plan was formulated with the resident on the day of the patient's visit. I have reviewed all imaging with the resident and agree with the findings in the documentation.     Yisel Fallon MD, CAQ, CCD  [facility]  Sports Medicine and Bone Health

## 2017-12-15 ENCOUNTER — RADIANT APPOINTMENT (OUTPATIENT)
Dept: MRI IMAGING | Facility: CLINIC | Age: 22
End: 2017-12-15
Attending: FAMILY MEDICINE
Payer: COMMERCIAL

## 2017-12-15 DIAGNOSIS — M22.2X1 PATELLOFEMORAL PAIN SYNDROME OF RIGHT KNEE: ICD-10-CM

## 2018-01-03 ENCOUNTER — OFFICE VISIT (OUTPATIENT)
Dept: ORTHOPEDICS | Facility: CLINIC | Age: 23
End: 2018-01-03
Payer: COMMERCIAL

## 2018-01-03 VITALS — BODY MASS INDEX: 19.6 KG/M2 | WEIGHT: 140 LBS | HEIGHT: 71 IN

## 2018-01-03 DIAGNOSIS — M22.41 CHONDROMALACIA OF RIGHT PATELLA: Primary | ICD-10-CM

## 2018-01-03 NOTE — PROGRESS NOTES
" Subjective:   Rosa Norris is a 22 year old female who complains of right knee pain. She is a patient of Dr. Cobb and has been doing physical therapy for the last 2 months with some improvement. Since seeing Dr. Cobb, she took about 6 weeks of rest, alleviating her symptoms. During that time, she saw PT who assessed her gait, stating otherwise normal save for a whip during swing phase. No specific work on mechanics. Since mid-November, has been trying return to run with interval running/walking. She cannot complete the return to run program given reproduction of right lateral patellar pain during the run phases. This is the only time she has symptoms. Otherwise asymptomatic at rest and walking/biking. Denies catching, popping, locking. Denies patellar dislocation/displacement. During her return to run, she has tried taping the patella, which did not improve her symptoms. She is frustrated regarding her inability to return to running.    Background:   Date of injury: None   Duration of symptoms: 3 months   Aggravating factors: Running   Relieving Factors: rest  Prior Evaluation: Prior Physician Evalutation: Dr. Cobb     PAST MEDICAL, SOCIAL, SURGICAL AND FAMILY HISTORY: She  has no past medical history on file.  She  has no past surgical history on file.  Her family history is not on file.  She reports that she has never smoked. She has never used smokeless tobacco.      ALLERGIES: She has No Known Allergies.    CURRENT MEDICATIONS: She currently has no medications in their medication list.     REVIEW OF SYSTEMS: 3 point review of systems is negative except as noted above.     Exam:   Ht 5' 11\" (1.803 m)  Wt 140 lb (63.5 kg)  BMI 19.53 kg/m2        CONSTITUTIONAL: healthy, alert and no distress  HEAD: Normocephalic. No masses, lesions, tenderness or abnormalities  SKIN: no suspicious lesions or rashes  GAIT: normal  NEUROLOGIC: Non-focal  PSYCHIATRIC: affect normal/bright and mentation appears " normal.    MUSCULOSKELETAL: Right Knee: Non tender on bony landmarks. No joint line tenderness, no pain with deep flexion, no pain with Mcmurrays. ROM/strength intact. Pain with patellar compression, less pain with patellar inhibition.     Hip: Hip abductor weakness on R. Significant dynamic valgus noted on BL single leg squat.     MRI R Knee: Impression:  1. 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 Possible grade 1 chondromalacia of lateral  patellar facet.  2. Mildly blunted appearance of the free edge of the medial meniscal  body, possibly reflecting free edge tear.     LORI DUNLAP     Assessment/Plan:   Patellofemoral pain, likely related to chondral wear in the trochlea. Unclear if this was related to acute trauma, abnormal mechanics, or if it was spontaneous event. She continues to do well but encounters issues while running. Discussed that the etiology of this issue may be related to poor hip abductor strength. Will refer for PT for rehabilitation of her BL hip abductors and ask that she RTC for ongoing evaluation.     Likely that she may have ongoing pain while running, given that her pain generator is more likely related to the area of chondral wear.     Angel Rodriguez MD  Primary Care Sports Medicine Fellow  January 11, 2018    REviewed information.  Patient was seen and examined with Sports Fellow.    GUSTAVO Gutierrez MD, sports medicine, CSC

## 2018-01-03 NOTE — MR AVS SNAPSHOT
After Visit Summary   1/3/2018    Rosa Norris    MRN: [id number]           Patient Information     Date Of Birth          1995        Visit Information        Provider Department      1/3/2018 4:00 PM Yisel Fallon MD [facility]        Today's Diagnoses     Chondromalacia of right patella    -  1       Follow-ups after your visit        Your next 10 appointments already scheduled     Jan 25, 2018  4:10 PM CST   MATEUS Extremity with Marcia Waldrop PT   [facility] MATE[facility] ([facility])    [address] [place] [id number] [phone]            Feb 13, 2018  4:50 PM CST   MATEUS Extremity with Marcia Waldrop PT   [facility] ([facility])    [address] [place] [id number] [phone]              Who to contact     Please call your clinic at [phone] to:    Ask questions about your health    Make or cancel appointments    Discuss your medicines    Learn about your test results    Speak to your doctor   If you have compliments or concerns about an experience at your clinic, or if you wish to file a complaint, please contact [facility] Patient Relations at [phone] or email us at [email or web address]         Additional Information About Your Visit        MyChart Information     [employer] gives you secure access to your electronic health record. If you see a primary care provider, you can also send messages to your care team and make appointments. If you have questions, please call your primary care clinic.  If you do not have a primary care provider, please call [phone] and they will assist you.      [employer] is an electronic gateway that provides easy, online access to your medical records. With [employer], you can request a clinic appointment, read your test results, renew a  "prescription or communicate with your care team.     To access your existing account, please contact your [facility] or call [phone] for assistance.        Care EveryWhere ID     This is your Care EveryWhere ID. This could be used by other organizations to access your [place] medical records  [id number]        Your Vitals Were     Height BMI (Body Mass Index)                1.803 m (5' 11\") 19.53 kg/m2           Blood Pressure from Last 3 Encounters:   No data found for BP    Weight from Last 3 Encounters:   01/03/18 63.5 kg (140 lb)   12/06/17 63.5 kg (140 lb)   10/05/17 63.5 kg (140 lb)              Today, you had the following     No orders found for display       Primary Care Provider    None Specified       No primary provider on file.        Equal Access to Services     [facility] : Tegan Vazquez, wailiana luqadaha, qaybta kaalmada reginaldo, kendall martinez . So [facility] [phone].    ATENCIÓN: Si habla español, tiene a jenkins disposición servicios gratuitos de asistencia lingüística. Llame al [phone].    We comply with applicable federal civil rights laws and [place] laws. We do not discriminate on the basis of race, color, national origin, age, disability, sex, sexual orientation, or gender identity.            Thank you!     Thank you for choosing [facility]  for your care. Our goal is always to provide you with excellent care. Hearing back from our patients is one way we can continue to improve our services. Please take a few minutes to complete the written survey that you may receive in the mail after your visit with us. Thank you!             Your Updated Medication List - Protect others around you: Learn how to safely use, store and throw away your medicines at www.disposemymeds.org.      Notice  As of 1/3/2018 11:59 PM    You have not been prescribed any medications.      "

## 2018-01-03 NOTE — PROGRESS NOTES
Preceptor Attestation:   Patient seen and discussed with the resident. Assessment and plan reviewed with resident and agreed upon.  Pt will f/u with Marcia Waldrop for Return to run, taping, and work on patellar tracking.  Significant hip weakness- needs strengthening.   Supervising Physician:  Arleen Gutierrez MD  Sports Medicine  [place] [facility]

## 2018-01-03 NOTE — LETTER
"  1/3/2018      RE: Rosa Norris  [address] [place] [address]        Subjective:   Rosa Norris is a 22 year old female who complains of right knee pain. She is a patient of Dr. Cobb and has been doing physical therapy for the last 2 months with some improvement. Since seeing Dr. Cobb, she took about 6 weeks of rest, alleviating her symptoms. During that time, she saw PT who assessed her gait, stating otherwise normal save for a whip during swing phase. No specific work on mechanics. Since mid-November, has been trying return to run with interval running/walking. She cannot complete the return to run program given reproduction of right lateral patellar pain during the run phases. This is the only time she has symptoms. Otherwise asymptomatic at rest and walking/biking. Denies catching, popping, locking. Denies patellar dislocation/displacement. During her return to run, she has tried taping the patella, which did not improve her symptoms. She is frustrated regarding her inability to return to running.    Background:   Date of injury: None   Duration of symptoms: 3 months   Aggravating factors: Running   Relieving Factors: rest  Prior Evaluation: Prior Physician Evalutation: Dr. Cobb     PAST MEDICAL, SOCIAL, SURGICAL AND FAMILY HISTORY: She  has no past medical history on file.  She  has no past surgical history on file.  Her family history is not on file.  She reports that she has never smoked. She has never used smokeless tobacco.      ALLERGIES: She has No Known Allergies.    CURRENT MEDICATIONS: She currently has no medications in their medication list.     REVIEW OF SYSTEMS: 3 point review of systems is negative except as noted above.     Exam:   Ht 5' 11\" (1.803 m)  Wt 140 lb (63.5 kg)  BMI 19.53 kg/m2        CONSTITUTIONAL: healthy, alert and no distress  HEAD: Normocephalic. No masses, lesions, tenderness or abnormalities  SKIN: no suspicious lesions or rashes  GAIT: normal  NEUROLOGIC: " Non-focal  PSYCHIATRIC: affect normal/bright and mentation appears normal.    MUSCULOSKELETAL: Right Knee: Non tender on bony landmarks. No joint line tenderness, no pain with deep flexion, no pain with Mcmurrays. ROM/strength intact. Pain with patellar compression, less pain with patellar inhibition.     Hip: Hip abductor weakness on R. Significant dynamic valgus noted on BL single leg squat.     MRI R Knee: Impression:  1. 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 Possible grade 1 chondromalacia of lateral  patellar facet.  2. Mildly blunted appearance of the free edge of the medial meniscal  body, possibly reflecting free edge tear.     LORI DUNLAP     Assessment/Plan:   Patellofemoral pain, likely related to chondral wear in the trochlea. Unclear if this was related to acute trauma, abnormal mechanics, or if it was spontaneous event. She continues to do well but encounters issues while running. Discussed that the etiology of this issue may be related to poor hip abductor strength. Will refer for PT for rehabilitation of her BL hip abductors and ask that she RTC for ongoing evaluation.     Likely that she may have ongoing pain while running, given that her pain generator is more likely related to the area of chondral wear.     Angel Rodriguez MD  Primary Care Sports Medicine Fellow  January 11, 2018        Preceptor Attestation:   Patient seen and discussed with the resident. Assessment and plan reviewed with resident and agreed upon.  Pt will f/u with Marcia Waldrop for Return to run, taping, and work on patellar tracking.  Significant hip weakness- needs strengthening.   Supervising Physician:  Arleen Gutierrez MD  Sports Medicine  [place] [facility] [place]    Yisel Fallon MD

## 2018-01-09 ENCOUNTER — THERAPY VISIT (OUTPATIENT)
Dept: PHYSICAL THERAPY | Facility: CLINIC | Age: 23
End: 2018-01-09
Payer: COMMERCIAL

## 2018-01-09 DIAGNOSIS — M25.561 RIGHT KNEE PAIN, UNSPECIFIED CHRONICITY: ICD-10-CM

## 2018-01-09 PROCEDURE — 97112 NEUROMUSCULAR REEDUCATION: CPT | Mod: GP | Performed by: PHYSICAL THERAPIST

## 2018-01-09 PROCEDURE — 97530 THERAPEUTIC ACTIVITIES: CPT | Mod: GP | Performed by: PHYSICAL THERAPIST

## 2018-01-09 PROCEDURE — 97110 THERAPEUTIC EXERCISES: CPT | Mod: GP | Performed by: PHYSICAL THERAPIST

## 2018-01-09 NOTE — MR AVS SNAPSHOT
After Visit Summary   1/9/2018    Rosa Norris    MRN: [id number]           Patient Information     Date Of Birth          1995        Visit Information        Provider Department      1/9/2018 7:00 AM Marcia Waldrop, CHELSEY CHAKRABORTY [facility]        Today's Diagnoses     Right knee pain, unspecified chronicity           Follow-ups after your visit        Your next 10 appointments already scheduled     Jan 25, 2018  4:10 PM CST   MATEUS Extremity with CHELSEY Romo [facility] ([facility])    [address] [place] [id number] [phone]            Feb 13, 2018  4:50 PM CST   MATEUS Extremity with CHELSEY Romo [facility] ([facility])    [address] [place] [address] [phone]              Who to contact     If you have questions or need follow up information about today's clinic visit or your schedule please contact [facility]AP[facility] directly at [phone].  Normal or non-critical lab and imaging results will be communicated to you by [employer], letter or phone within 4 business days after the clinic has received the results. If you do not hear from us within 7 days, please contact the clinic through [employer] or phone. If you have a critical or abnormal lab result, we will notify you by phone as soon as possible.  Submit refill requests through [employer] or call your pharmacy and they will forward the refill request to us. Please allow 3 business days for your refill to be completed.          Additional Information About Your Visit        [employer] Information     [employer] gives you secure access to your electronic health record. If you see a primary care provider, you can also send messages to your care team and make appointments. If you have questions, please call your primary care clinic.  If you do not have a primary  care provider, please call [phone] and they will assist you.        Care EveryWhere ID     This is your Care EveryWhere ID. This could be used by other organizations to access your [place] medical records  [id number]         Blood Pressure from Last 3 Encounters:   No data found for BP    Weight from Last 3 Encounters:   01/03/18 63.5 kg (140 lb)   12/06/17 63.5 kg (140 lb)   10/05/17 63.5 kg (140 lb)              We Performed the Following     NEUROMUSCULAR RE-EDUCATION     THERAPEUTIC ACTIVITIES     THERAPEUTIC EXERCISES        Primary Care Provider    None Specified       No primary provider on file.        Equal Access to Services     [facility]: Hadii owen Vazquez, wailiana ramirez, cailin hair, kendall martinez . So [facility] [phone].    ATENCIÓN: Si habla español, tiene a jenkins disposición servicios gratuitos de asistencia lingüística. Llame al [phone].    We comply with applicable federal civil rights laws and [place] laws. We do not discriminate on the basis of race, color, national origin, age, disability, sex, sexual orientation, or gender identity.            Thank you!     Thank you for choosing [facility] THERAP[facility]  for your care. Our goal is always to provide you with excellent care. Hearing back from our patients is one way we can continue to improve our services. Please take a few minutes to complete the written survey that you may receive in the mail after your visit with us. Thank you!             Your Updated Medication List - Protect others around you: Learn how to safely use, store and throw away your medicines at www.disposemymeds.org.      Notice  As of 1/9/2018  8:13 AM    You have not been prescribed any medications.

## 2018-01-09 NOTE — PROGRESS NOTES
Subjective:  HPI                    Objective:  System    Physical Exam    General     ROS    Assessment/Plan:    PROGRESS  REPORT    Progress reporting period is from 10/11/207 to 1/9/2018.       SUBJECTIVE  Subjective changes noted by patient:  Returns to PT after 1 month break. She returned to see MDs and had an MRI (results below) and is trying to be optimistic + realistic about her running future. She wants to continue to run and is willing to vary her distances and intensity to help make that happen. She had pain during the return to run program at the 3 minute (week 3) level. She has been hit or miss with her strengthening around the holidays. She has no pain with day to day activities.       Current pain level is 0/10  .     Previous pain level was  6/10  .   Changes in function:  Yes (See Goal flowsheet attached for changes in current functional level)  Adverse reaction to treatment or activity: None    OBJECTIVE  Changes noted in objective findings:    Decrease anterior knee excursion with DL squats.     SL squats: lacks trunk control, femoral internal rotation and adduction noted - poor proprioception bilaterally    Improved trunk control noted with ASLR + DL bridge.     Pain with palpation on lateral patellar boarder, pain with lateral glide of the patella.     HIP LEFT (lbs) RIGHT (lbs)   Adduction 30, 31, 30 25, 25, 21   Abduction 20, 21, 15 26, 22, 22   External Rotation 19, 20 20 19, 24, 23   Internal Rotation 14, 15, 17 24, 21, 19   Extension 19, 18, 17 23, 22, 19     Trial of lateral to medial glide taping with return to run.   Plan: attempt return to run again, if continued difficulties will perform another running analysis and madeleine manipulation.     MRI Findings:   Impression:  1. 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 Possible grade 1 chondromalacia of lateral  patellar facet.  2. Mildly blunted appearance of the free edge of the medial  meniscal  body, possibly reflecting free edge tear.        ASSESSMENT/PLAN  Updated problem list and treatment plan: Diagnosis 1:  Patella-femoral Chrondromalcia  Pain -  hot/cold therapy, manual therapy, STS, splint/taping/bracing/orthotics, self management and education  Decreased ROM/flexibility - manual therapy, therapeutic exercise and home program  Decreased strength - therapeutic exercise, therapeutic activities and home program  Impaired balance - neuro re-education, therapeutic activities and home program  Decreased proprioception - neuro re-education, therapeutic activities and home program  Impaired muscle performance - neuro re-education and home program  Decreased function - therapeutic activities  STG/LTGs have been met or progress has been made towards goals:  Yes (See Goal flow sheet completed today.)  Assessment of Progress: The patient's condition has potential to improve.  Self Management Plans:  Patient has been instructed in a home treatment program.  Patient  has been instructed in self management of symptoms.  I have re-evaluated this patient and find that the nature, scope, duration and intensity of the therapy is appropriate for the medical condition of the patient.  Rosa continues to require the following intervention to meet STG and LTG's:  PT    Recommendations:  This patient would benefit from continued therapy.     Frequency:  2 X a month, once daily  Duration:  for 3 months          Please refer to the daily flowsheet for treatment today, total treatment time and time spent performing 1:1 timed codes.

## 2018-01-25 ENCOUNTER — THERAPY VISIT (OUTPATIENT)
Dept: PHYSICAL THERAPY | Facility: CLINIC | Age: 23
End: 2018-01-25
Payer: COMMERCIAL

## 2018-01-25 DIAGNOSIS — M25.561 RIGHT KNEE PAIN, UNSPECIFIED CHRONICITY: ICD-10-CM

## 2018-01-25 PROCEDURE — 97110 THERAPEUTIC EXERCISES: CPT | Mod: GP | Performed by: PHYSICAL THERAPIST

## 2018-01-25 PROCEDURE — 97112 NEUROMUSCULAR REEDUCATION: CPT | Mod: GP | Performed by: PHYSICAL THERAPIST

## 2018-01-25 PROCEDURE — 97530 THERAPEUTIC ACTIVITIES: CPT | Mod: GP | Performed by: PHYSICAL THERAPIST

## 2018-01-28 ENCOUNTER — HEALTH MAINTENANCE LETTER (OUTPATIENT)
Age: 23
End: 2018-01-28

## 2018-02-13 ENCOUNTER — THERAPY VISIT (OUTPATIENT)
Dept: PHYSICAL THERAPY | Facility: CLINIC | Age: 23
End: 2018-02-13
Payer: COMMERCIAL

## 2018-02-13 DIAGNOSIS — M25.561 RIGHT KNEE PAIN, UNSPECIFIED CHRONICITY: ICD-10-CM

## 2018-02-13 PROCEDURE — 97530 THERAPEUTIC ACTIVITIES: CPT | Mod: GP | Performed by: PHYSICAL THERAPIST

## 2018-02-13 PROCEDURE — 97110 THERAPEUTIC EXERCISES: CPT | Mod: GP | Performed by: PHYSICAL THERAPIST

## 2018-03-14 ENCOUNTER — THERAPY VISIT (OUTPATIENT)
Dept: PHYSICAL THERAPY | Facility: CLINIC | Age: 23
End: 2018-03-14
Payer: COMMERCIAL

## 2018-03-14 DIAGNOSIS — M25.561 RIGHT KNEE PAIN, UNSPECIFIED CHRONICITY: ICD-10-CM

## 2018-03-14 PROCEDURE — 97110 THERAPEUTIC EXERCISES: CPT | Mod: GP | Performed by: PHYSICAL THERAPIST

## 2018-03-14 PROCEDURE — 97530 THERAPEUTIC ACTIVITIES: CPT | Mod: GP | Performed by: PHYSICAL THERAPIST

## 2018-03-14 PROCEDURE — 97112 NEUROMUSCULAR REEDUCATION: CPT | Mod: GP | Performed by: PHYSICAL THERAPIST

## 2018-06-05 PROBLEM — M25.561 RIGHT KNEE PAIN: Status: RESOLVED | Noted: 2017-10-12 | Resolved: 2018-06-05

## 2020-03-11 ENCOUNTER — HEALTH MAINTENANCE LETTER (OUTPATIENT)
Age: 25
End: 2020-03-11

## 2020-12-27 ENCOUNTER — HEALTH MAINTENANCE LETTER (OUTPATIENT)
Age: 25
End: 2020-12-27

## 2021-03-25 ENCOUNTER — RECORDS - HEALTHEAST (OUTPATIENT)
Dept: ADMINISTRATIVE | Facility: OTHER | Age: 26
End: 2021-03-25

## 2021-03-25 LAB
BKR LAB AP ABNORMAL BLEEDING: NO
BKR LAB AP BIRTH CONTROL/HORMONES: NORMAL
BKR LAB AP CERVICAL APPEARANCE: NORMAL
BKR LAB AP GYN ADEQUACY: NORMAL
BKR LAB AP GYN INTERPRETATION: NORMAL
BKR LAB AP HPV REFLEX: NORMAL
BKR LAB AP LMP: NORMAL
BKR LAB AP PATIENT STATUS: NORMAL
BKR LAB AP PREVIOUS ABNORMAL: NORMAL
BKR LAB AP PREVIOUS NORMAL: 2018
HIGH RISK?: NO
PATH REPORT.COMMENTS IMP SPEC: NORMAL
RESULT FLAG (HE HISTORICAL CONVERSION): NORMAL

## 2021-04-25 ENCOUNTER — HEALTH MAINTENANCE LETTER (OUTPATIENT)
Age: 26
End: 2021-04-25

## 2021-10-09 ENCOUNTER — HEALTH MAINTENANCE LETTER (OUTPATIENT)
Age: 26
End: 2021-10-09

## 2022-05-21 ENCOUNTER — HEALTH MAINTENANCE LETTER (OUTPATIENT)
Age: 27
End: 2022-05-21

## 2022-09-17 ENCOUNTER — HEALTH MAINTENANCE LETTER (OUTPATIENT)
Age: 27
End: 2022-09-17

## 2023-06-04 ENCOUNTER — HEALTH MAINTENANCE LETTER (OUTPATIENT)
Age: 28
End: 2023-06-04